# Patient Record
Sex: MALE | Race: WHITE | NOT HISPANIC OR LATINO | URBAN - METROPOLITAN AREA
[De-identification: names, ages, dates, MRNs, and addresses within clinical notes are randomized per-mention and may not be internally consistent; named-entity substitution may affect disease eponyms.]

---

## 2018-10-28 ENCOUNTER — EMERGENCY (EMERGENCY)
Facility: HOSPITAL | Age: 28
LOS: 1 days | Discharge: HOME | End: 2018-10-28
Admitting: EMERGENCY MEDICINE
Payer: COMMERCIAL

## 2018-10-28 VITALS
RESPIRATION RATE: 18 BRPM | HEART RATE: 80 BPM | DIASTOLIC BLOOD PRESSURE: 74 MMHG | OXYGEN SATURATION: 98 % | SYSTOLIC BLOOD PRESSURE: 132 MMHG | TEMPERATURE: 98 F

## 2018-10-28 VITALS
SYSTOLIC BLOOD PRESSURE: 131 MMHG | TEMPERATURE: 98 F | DIASTOLIC BLOOD PRESSURE: 72 MMHG | OXYGEN SATURATION: 98 % | RESPIRATION RATE: 18 BRPM | HEART RATE: 82 BPM

## 2018-10-28 PROCEDURE — 99053 MED SERV 10PM-8AM 24 HR FAC: CPT

## 2018-10-28 PROCEDURE — 99284 EMERGENCY DEPT VISIT MOD MDM: CPT | Mod: 25

## 2018-10-28 NOTE — ED ADULT NURSE NOTE - NSIMPLEMENTINTERV_GEN_ALL_ED
Implemented All Universal Safety Interventions:  [place] to call system. Call bell, personal items and telephone within reach. Instruct patient to call for assistance. Room bathroom lighting operational. Non-slip footwear when patient is off stretcher. Physically safe environment: no spills, clutter or unnecessary equipment. Stretcher in lowest position, wheels locked, appropriate side rails in place.

## 2018-10-28 NOTE — ED PROVIDER NOTE - MEDICAL DECISION MAKING DETAILS
patient BIB EMS for AMS. admits to drinking. denies fall. patient is alert oriented to person, speech slurred and gait  unsteady. will continue to monitor until clinical sobriety

## 2018-10-28 NOTE — ED ADULT NURSE NOTE - OBJECTIVE STATEMENT
pt is a 28 year old male who was BIBA for AMS. [place]. pt Alert and awake, ambulating w steady gait, denies injury or trauma. pt reports drinking alcohol tonight.

## 2018-10-28 NOTE — ED PROVIDER NOTE - OBJECTIVE STATEMENT
Patient BIB EMS for AMS, as per EMS patient was found on the [place] train attempting to go home to [facility]. Admits to having more than 8 drinks this evening.

## 2018-11-01 DIAGNOSIS — F10.129 ALCOHOL ABUSE WITH INTOXICATION, UNSPECIFIED: ICD-10-CM

## 2018-11-01 DIAGNOSIS — R41.82 ALTERED MENTAL STATUS, UNSPECIFIED: ICD-10-CM

## 2024-07-15 NOTE — ED PROVIDER NOTE - CONSTITUTIONAL, MLM
Hypotensive at home and initial BP borderline.  ABX started and septic workup.  BP improved to 120s/60s, on room air    Plan:   - Holding maps in 70s-80s without IVF   - Agree with broad spec abx for possible sepsis, thou unclear on source but has had multiple ERCP with stent placements would cover intraabdominal    - Consider IVF, however in setting of LE edema   - Patient without ICU requirements at this time    - - -